# Patient Record
Sex: FEMALE | Race: WHITE
[De-identification: names, ages, dates, MRNs, and addresses within clinical notes are randomized per-mention and may not be internally consistent; named-entity substitution may affect disease eponyms.]

---

## 2017-03-14 ENCOUNTER — HOSPITAL ENCOUNTER (OUTPATIENT)
Dept: HOSPITAL 61 - PCVCCLINIC | Age: 67
Discharge: HOME | End: 2017-03-14
Attending: INTERNAL MEDICINE
Payer: MEDICARE

## 2017-03-14 DIAGNOSIS — E78.5: Primary | ICD-10-CM

## 2017-03-14 DIAGNOSIS — E11.9: ICD-10-CM

## 2017-03-14 DIAGNOSIS — I25.10: ICD-10-CM

## 2017-03-14 DIAGNOSIS — I42.9: ICD-10-CM

## 2017-03-14 DIAGNOSIS — R07.89: ICD-10-CM

## 2017-03-14 DIAGNOSIS — I10: ICD-10-CM

## 2017-03-14 PROCEDURE — G0463 HOSPITAL OUTPT CLINIC VISIT: HCPCS

## 2017-03-14 PROCEDURE — 93005 ELECTROCARDIOGRAM TRACING: CPT

## 2017-03-14 PROCEDURE — 80061 LIPID PANEL: CPT

## 2017-09-27 ENCOUNTER — HOSPITAL ENCOUNTER (OUTPATIENT)
Dept: HOSPITAL 61 - PCVCCLINIC | Age: 67
Discharge: HOME | End: 2017-09-27
Attending: INTERNAL MEDICINE
Payer: MEDICARE

## 2017-09-27 DIAGNOSIS — E11.9: ICD-10-CM

## 2017-09-27 DIAGNOSIS — Z88.0: ICD-10-CM

## 2017-09-27 DIAGNOSIS — R07.89: ICD-10-CM

## 2017-09-27 DIAGNOSIS — I10: ICD-10-CM

## 2017-09-27 DIAGNOSIS — M19.90: ICD-10-CM

## 2017-09-27 DIAGNOSIS — E78.4: ICD-10-CM

## 2017-09-27 DIAGNOSIS — I25.10: Primary | ICD-10-CM

## 2017-09-27 DIAGNOSIS — R06.01: ICD-10-CM

## 2017-09-27 DIAGNOSIS — Z88.8: ICD-10-CM

## 2017-09-27 DIAGNOSIS — R42: ICD-10-CM

## 2017-09-27 DIAGNOSIS — Z96.652: ICD-10-CM

## 2017-09-27 DIAGNOSIS — R53.83: ICD-10-CM

## 2017-09-27 DIAGNOSIS — Z79.82: ICD-10-CM

## 2017-09-27 DIAGNOSIS — Z79.4: ICD-10-CM

## 2017-09-27 PROCEDURE — 93005 ELECTROCARDIOGRAM TRACING: CPT

## 2017-09-27 PROCEDURE — 80061 LIPID PANEL: CPT

## 2017-09-27 PROCEDURE — G0463 HOSPITAL OUTPT CLINIC VISIT: HCPCS

## 2017-10-06 ENCOUNTER — HOSPITAL ENCOUNTER (OUTPATIENT)
Dept: HOSPITAL 61 - PCVCIMAG | Age: 67
Discharge: HOME | End: 2017-10-06
Attending: INTERNAL MEDICINE
Payer: MEDICARE

## 2017-10-06 DIAGNOSIS — I08.1: Primary | ICD-10-CM

## 2017-10-06 DIAGNOSIS — I10: ICD-10-CM

## 2017-10-06 DIAGNOSIS — E11.9: ICD-10-CM

## 2017-10-06 DIAGNOSIS — Z96.652: ICD-10-CM

## 2017-10-06 DIAGNOSIS — E78.4: ICD-10-CM

## 2017-10-06 DIAGNOSIS — Z88.8: ICD-10-CM

## 2017-10-06 DIAGNOSIS — M19.90: ICD-10-CM

## 2017-10-06 DIAGNOSIS — Z95.1: ICD-10-CM

## 2017-10-06 DIAGNOSIS — Z79.899: ICD-10-CM

## 2017-10-06 DIAGNOSIS — Z79.4: ICD-10-CM

## 2017-10-06 DIAGNOSIS — Z79.82: ICD-10-CM

## 2017-10-06 DIAGNOSIS — Z95.5: ICD-10-CM

## 2017-10-06 DIAGNOSIS — I25.10: ICD-10-CM

## 2017-10-06 DIAGNOSIS — Z88.0: ICD-10-CM

## 2017-10-06 PROCEDURE — 93306 TTE W/DOPPLER COMPLETE: CPT

## 2017-10-06 NOTE — PCVCIMAG
--------------- APPROVED REPORT --------------





Study performed:  10/06/2017 08:41:30



EXAM: Comprehensive 2D, Doppler, and color-flow 

Echocardiogram

Patient Location: Echo lab

Status:  routine



BSA:         2.14

HR: 66 bpmBP:          144/74 mmHg

Rhythm: NSR



Other Information 

Study Quality: AdequateTechnically Limited

Technically limited study due to  body habitus.



Risk Factors: 

Cardiac Risk Factors:  DM



Indications

Diabetes

Dyspnea 

CAD

Cardiomyopathy



2D Dimensions

LVEF(%):  34.24 (&gt;50%)

IVSd:  12.36 (7-11mm)

LVDd:  39.22 mm

PWd:  11.55 (7-11mm)

LVDs:  32.93 (25-40mm)

Left Atrium:  36.58 (27-40mm)

Aortic Root:  29.36 mm

LV Single Plane 4CH:  50.04 %

LV Single Plane 2CH:  47.69 %Cazares's LVEF:  48.87 %

Biplane EF:  49.6 %



Volumes

Left Atrial Volume (Systole)

Single Plane 4CH:  78.04 mLSingle Plane 2CH:  91.70 mL

LA ESV Index:  41.00 mL/m2



Aortic Valve

AoV Peak Pierre.:  1.90 m/s

AO Peak Gr.:  14.37 mmHgLVOT Max P.99 mmHg

LVOT Max V:  1.22 m/s



Mitral Valve

MV Peak Gr.:  13.42 mmHg

MV Mean Gr.:  4.95 mmHgE/A Ratio:  1.2

MV Decel. Time:  344.19 ms

MV E Max Pierre.:  1.69 m/s

MV A Pierre.:  1.41 m/s

MV Max Pierre.:  1.83 m/s

MV Mean Pierre.:  1.01 m/s

MV VTI:  598.50 mm

MV PHT:  113.30 ms

MVA (PHT):  1.94 cm2

IVRT:  83.04 ms



Pulmonary Valve

PV Peak Pierre.:  1.23 m/sPV Peak Gr.:  6.04 mmHg



Pulmonary Vein

P Vein S:    0.55 m/sP Vein A:  0.33 m/s

P Vein D:   0.63 m/sP Vein A Dur.:  145.3 msec

P Vein S/D Ratio:  0.87



Tricuspid Valve

TR Peak Pierre.:  2.42 m/s

TR Peak Gr.:  23.35 mmHg



Left Ventricle

The left ventricle is normal size. There is normal LV segmental wall 

motion. Mild concentric left ventricular hypertrophy. Left 

ventricular systolic function is within lower limits of normal. LVEF 

is 50%. Grade II - pseudonormal filling dynamics.



Right Ventricle

The right ventricle is normal size. The right ventricular systolic 

function is normal.



Atria

Left atrium is mild-moderately dilated. The right atrium size is 

normal.



Aortic Valve

The Aortic valve is moderately sclerotic. No aortic regurgitation is 

present. There is no aortic valvular stenosis.



Mitral Valve

Calcified mitral apparatus causing mitral stenosis. There is no 

mitral valve regurgitation noted. Mild mitral stenosis. Calculated 

mitral valve area is 1.9 cm2 with mean pressure gradient of 5 

mmHg.



Tricuspid Valve

The tricuspid valve is normal in structure. Trace tricuspid 

regurgitation with PAP of 30 mmHg.



Pulmonic Valve

The pulmonary valve is normal in structure. Trace pulmonic 

regurgitation.



Great Vessels

The aortic root is normal in size. IVC is normal in size and 

collapses with &gt;50% inspiration



Pericardium

There is no pericardial effusion.



&lt;Conclusion&gt;

The left ventricle is normal size.

Mild concentric left ventricular hypertrophy.

LVEF is 50%.

Grade II - pseudonormal filling dynamics.

The right ventricle is normal size.

Left atrium is mild-moderately dilated.

The Aortic valve is moderately sclerotic.

Calcified mitral apparatus causing mitral stenosis.

Mild mitral stenosis.

Calculated mitral valve area is 1.9 cm2 with mean pressure gradient 

of 5 mmHg.

Trace tricuspid regurgitation with PAP of 30 mmHg.

There is no pericardial effusion.

## 2018-04-10 ENCOUNTER — HOSPITAL ENCOUNTER (OUTPATIENT)
Dept: HOSPITAL 61 - PCVCCLINIC | Age: 68
Discharge: HOME | End: 2018-04-10
Attending: INTERNAL MEDICINE
Payer: MEDICARE

## 2018-04-10 DIAGNOSIS — E78.00: ICD-10-CM

## 2018-04-10 DIAGNOSIS — I25.10: Primary | ICD-10-CM

## 2018-04-10 DIAGNOSIS — Z79.899: ICD-10-CM

## 2018-04-10 DIAGNOSIS — I42.9: ICD-10-CM

## 2018-04-10 DIAGNOSIS — I74.9: ICD-10-CM

## 2018-04-10 DIAGNOSIS — I10: ICD-10-CM

## 2018-04-10 DIAGNOSIS — Z79.82: ICD-10-CM

## 2018-04-10 PROCEDURE — 80061 LIPID PANEL: CPT

## 2018-04-10 PROCEDURE — 93005 ELECTROCARDIOGRAM TRACING: CPT

## 2018-11-28 ENCOUNTER — HOSPITAL ENCOUNTER (OUTPATIENT)
Dept: HOSPITAL 61 - PCVCCLINIC | Age: 68
Discharge: HOME | End: 2018-11-28
Attending: INTERNAL MEDICINE
Payer: MEDICARE

## 2018-11-28 DIAGNOSIS — I11.0: Primary | ICD-10-CM

## 2018-11-28 DIAGNOSIS — I25.10: ICD-10-CM

## 2018-11-28 DIAGNOSIS — Z79.82: ICD-10-CM

## 2018-11-28 DIAGNOSIS — E78.00: ICD-10-CM

## 2018-11-28 DIAGNOSIS — J44.9: ICD-10-CM

## 2018-11-28 DIAGNOSIS — Z79.4: ICD-10-CM

## 2018-11-28 DIAGNOSIS — E11.9: ICD-10-CM

## 2018-11-28 DIAGNOSIS — Z79.899: ICD-10-CM

## 2018-11-28 DIAGNOSIS — I50.32: ICD-10-CM

## 2018-11-28 PROCEDURE — 93005 ELECTROCARDIOGRAM TRACING: CPT

## 2018-11-28 PROCEDURE — G0463 HOSPITAL OUTPT CLINIC VISIT: HCPCS

## 2019-01-24 ENCOUNTER — HOSPITAL ENCOUNTER (OUTPATIENT)
Dept: HOSPITAL 61 - PCVCCLINIC | Age: 69
Discharge: HOME | End: 2019-01-24
Attending: INTERNAL MEDICINE
Payer: MEDICARE

## 2019-01-24 DIAGNOSIS — I25.2: ICD-10-CM

## 2019-01-24 DIAGNOSIS — I50.32: ICD-10-CM

## 2019-01-24 DIAGNOSIS — I13.0: Primary | ICD-10-CM

## 2019-01-24 DIAGNOSIS — E11.22: ICD-10-CM

## 2019-01-24 DIAGNOSIS — N18.3: ICD-10-CM

## 2019-01-24 DIAGNOSIS — Z79.82: ICD-10-CM

## 2019-01-24 DIAGNOSIS — Z88.8: ICD-10-CM

## 2019-01-24 DIAGNOSIS — I25.10: ICD-10-CM

## 2019-01-24 DIAGNOSIS — Z79.4: ICD-10-CM

## 2019-01-24 DIAGNOSIS — R00.1: ICD-10-CM

## 2019-01-24 DIAGNOSIS — E78.5: ICD-10-CM

## 2019-01-24 PROCEDURE — G0463 HOSPITAL OUTPT CLINIC VISIT: HCPCS

## 2019-01-24 PROCEDURE — 93005 ELECTROCARDIOGRAM TRACING: CPT

## 2019-06-04 ENCOUNTER — HOSPITAL ENCOUNTER (OUTPATIENT)
Dept: HOSPITAL 61 - PCVCCLINIC | Age: 69
Discharge: HOME | End: 2019-06-04
Attending: INTERNAL MEDICINE
Payer: MEDICARE

## 2019-06-04 DIAGNOSIS — I25.2: ICD-10-CM

## 2019-06-04 DIAGNOSIS — Z88.8: ICD-10-CM

## 2019-06-04 DIAGNOSIS — I25.10: Primary | ICD-10-CM

## 2019-06-04 DIAGNOSIS — I74.9: ICD-10-CM

## 2019-06-04 DIAGNOSIS — E78.5: ICD-10-CM

## 2019-06-04 DIAGNOSIS — I50.9: ICD-10-CM

## 2019-06-04 DIAGNOSIS — R07.9: ICD-10-CM

## 2019-06-04 DIAGNOSIS — I11.0: ICD-10-CM

## 2019-06-04 DIAGNOSIS — Z79.899: ICD-10-CM

## 2019-06-04 DIAGNOSIS — R42: ICD-10-CM

## 2019-06-04 DIAGNOSIS — E11.9: ICD-10-CM

## 2019-06-04 DIAGNOSIS — R00.1: ICD-10-CM

## 2019-06-04 DIAGNOSIS — Z79.82: ICD-10-CM

## 2019-06-04 DIAGNOSIS — I42.9: ICD-10-CM

## 2019-06-04 DIAGNOSIS — R53.83: ICD-10-CM

## 2019-06-04 PROCEDURE — 93005 ELECTROCARDIOGRAM TRACING: CPT

## 2019-06-04 PROCEDURE — G0463 HOSPITAL OUTPT CLINIC VISIT: HCPCS

## 2019-06-04 PROCEDURE — 36415 COLL VENOUS BLD VENIPUNCTURE: CPT

## 2019-07-10 ENCOUNTER — HOSPITAL ENCOUNTER (OUTPATIENT)
Dept: HOSPITAL 61 - PCVCCLINIC | Age: 69
Discharge: HOME | End: 2019-07-10
Attending: NURSE PRACTITIONER
Payer: MEDICARE

## 2019-07-10 DIAGNOSIS — E11.22: ICD-10-CM

## 2019-07-10 DIAGNOSIS — N18.4: ICD-10-CM

## 2019-07-10 DIAGNOSIS — Z88.5: ICD-10-CM

## 2019-07-10 DIAGNOSIS — Z88.1: ICD-10-CM

## 2019-07-10 DIAGNOSIS — I12.9: ICD-10-CM

## 2019-07-10 DIAGNOSIS — Z88.6: ICD-10-CM

## 2019-07-10 DIAGNOSIS — I25.10: Primary | ICD-10-CM

## 2019-07-10 DIAGNOSIS — E78.00: ICD-10-CM

## 2019-07-10 DIAGNOSIS — Z88.0: ICD-10-CM

## 2019-07-10 DIAGNOSIS — E78.5: ICD-10-CM

## 2019-07-10 DIAGNOSIS — I42.9: ICD-10-CM

## 2019-07-10 PROCEDURE — 80061 LIPID PANEL: CPT

## 2019-07-10 PROCEDURE — G0463 HOSPITAL OUTPT CLINIC VISIT: HCPCS

## 2019-07-10 PROCEDURE — 36415 COLL VENOUS BLD VENIPUNCTURE: CPT

## 2019-07-10 PROCEDURE — 93005 ELECTROCARDIOGRAM TRACING: CPT
